# Patient Record
Sex: FEMALE | Race: WHITE | NOT HISPANIC OR LATINO | Employment: FULL TIME | URBAN - METROPOLITAN AREA
[De-identification: names, ages, dates, MRNs, and addresses within clinical notes are randomized per-mention and may not be internally consistent; named-entity substitution may affect disease eponyms.]

---

## 2022-12-30 ENCOUNTER — HOSPITAL ENCOUNTER (EMERGENCY)
Facility: HOSPITAL | Age: 47
Discharge: HOME | End: 2022-12-30
Attending: EMERGENCY MEDICINE

## 2022-12-30 ENCOUNTER — APPOINTMENT (EMERGENCY)
Dept: CT IMAGING | Facility: HOSPITAL | Age: 47
End: 2022-12-30

## 2022-12-30 VITALS
OXYGEN SATURATION: 99 % | TEMPERATURE: 97 F | DIASTOLIC BLOOD PRESSURE: 72 MMHG | RESPIRATION RATE: 18 BRPM | HEART RATE: 93 BPM | SYSTOLIC BLOOD PRESSURE: 113 MMHG

## 2022-12-30 DIAGNOSIS — S91.112A LACERATION OF LEFT GREAT TOE: ICD-10-CM

## 2022-12-30 DIAGNOSIS — S61.511A LACERATION OF RIGHT WRIST, INITIAL ENCOUNTER: ICD-10-CM

## 2022-12-30 DIAGNOSIS — S02.2XXA NASAL FRACTURE: ICD-10-CM

## 2022-12-30 DIAGNOSIS — R55 SYNCOPE: Primary | ICD-10-CM

## 2022-12-30 LAB
ANION GAP SERPL CALCULATED.3IONS-SCNC: 8 MMOL/L (ref 4–13)
BASOPHILS # BLD AUTO: 0.02 THOUSANDS/ÂΜL (ref 0–0.1)
BASOPHILS NFR BLD AUTO: 0 % (ref 0–1)
BUN SERPL-MCNC: 14 MG/DL (ref 5–25)
CALCIUM SERPL-MCNC: 8 MG/DL (ref 8.3–10.1)
CHLORIDE SERPL-SCNC: 105 MMOL/L (ref 96–108)
CO2 SERPL-SCNC: 25 MMOL/L (ref 21–32)
CREAT SERPL-MCNC: 0.92 MG/DL (ref 0.6–1.3)
EOSINOPHIL # BLD AUTO: 0.04 THOUSAND/ÂΜL (ref 0–0.61)
EOSINOPHIL NFR BLD AUTO: 1 % (ref 0–6)
ERYTHROCYTE [DISTWIDTH] IN BLOOD BY AUTOMATED COUNT: 12.5 % (ref 11.6–15.1)
GFR SERPL CREATININE-BSD FRML MDRD: 73 ML/MIN/1.73SQ M
GLUCOSE SERPL-MCNC: 108 MG/DL (ref 65–140)
HCT VFR BLD AUTO: 35.9 % (ref 34.8–46.1)
HGB BLD-MCNC: 11.9 G/DL (ref 11.5–15.4)
IMM GRANULOCYTES # BLD AUTO: 0.05 THOUSAND/UL (ref 0–0.2)
IMM GRANULOCYTES NFR BLD AUTO: 1 % (ref 0–2)
LYMPHOCYTES # BLD AUTO: 0.64 THOUSANDS/ÂΜL (ref 0.6–4.47)
LYMPHOCYTES NFR BLD AUTO: 7 % (ref 14–44)
MCH RBC QN AUTO: 28.5 PG (ref 26.8–34.3)
MCHC RBC AUTO-ENTMCNC: 33.1 G/DL (ref 31.4–37.4)
MCV RBC AUTO: 86 FL (ref 82–98)
MONOCYTES # BLD AUTO: 0.75 THOUSAND/ÂΜL (ref 0.17–1.22)
MONOCYTES NFR BLD AUTO: 9 % (ref 4–12)
NEUTROPHILS # BLD AUTO: 7.29 THOUSANDS/ÂΜL (ref 1.85–7.62)
NEUTS SEG NFR BLD AUTO: 82 % (ref 43–75)
NRBC BLD AUTO-RTO: 0 /100 WBCS
PLATELET # BLD AUTO: 205 THOUSANDS/UL (ref 149–390)
PMV BLD AUTO: 9.4 FL (ref 8.9–12.7)
POTASSIUM SERPL-SCNC: 3.8 MMOL/L (ref 3.5–5.3)
RBC # BLD AUTO: 4.17 MILLION/UL (ref 3.81–5.12)
SODIUM SERPL-SCNC: 138 MMOL/L (ref 135–147)
WBC # BLD AUTO: 8.79 THOUSAND/UL (ref 4.31–10.16)

## 2022-12-30 NOTE — DISCHARGE INSTRUCTIONS
Your nose has a minimally displaced fracture follow-up with otolaryngology  You can wash your lacerations after 3 to 5 days  The wound adhesive will slowly wear off  Return increasing weakness, passing out again or any problems

## 2022-12-30 NOTE — ED PROVIDER NOTES
History  Chief Complaint   Patient presents with   • Syncope     Syncope while in bathroom, fell forward, injured lac, 2 other minor lacs      HPI is a 53-year-old female, reports awoke and felt nauseous this morning apparently sat on the toilet and used a ceramic trash can to throw up into  She reports then passing out  Patient reports everything went black and she fell forward  Patient has 2 small abrasion type lacerations on her left side of her nose  She has some swelling around the nose  She reports some tenderness of the left central incisor but she reports its not loose  She reports a laceration on the radial side of the right wrist   She reports a small laceration on the base of the left great toe she believes from the broken trash can  Denies any recent drinking  She reports they were tubing all day yesterday she was probably dehydrated and with standing got up and got nauseous today when she awoke  Ports history of syncope in the past very similar episode approximately 20 years ago  Past medical history no history of heart disease, denies anemia  Family history noncontributory   Social history, visiting the area, denies drug abuse    None       No past medical history on file  No past surgical history on file  No family history on file  I have reviewed and agree with the history as documented  No existing history information found  No existing history information found  Review of Systems   Constitutional: Negative for diaphoresis, fatigue and fever  HENT: Negative for congestion, ear pain, nosebleeds and sore throat  Eyes: Negative for photophobia, pain, discharge and visual disturbance  Respiratory: Negative for cough, choking, chest tightness, shortness of breath and wheezing  Cardiovascular: Negative for chest pain and palpitations  Gastrointestinal: Negative for abdominal distention, abdominal pain, diarrhea and vomiting     Genitourinary: Negative for dysuria, flank pain and frequency  Musculoskeletal: Negative for back pain, gait problem and joint swelling  Skin: Positive for wound  Negative for color change and rash  Neurological: Negative for dizziness, syncope and headaches  Psychiatric/Behavioral: Negative for behavioral problems and confusion  The patient is not nervous/anxious  All other systems reviewed and are negative  Physical Exam  Physical Exam  Vitals and nursing note reviewed  Constitutional:       Appearance: She is well-developed  HENT:      Head: Normocephalic  Right Ear: External ear normal       Left Ear: External ear normal       Nose: Nose normal       Comments: There are 2 superficial abrasions on the patient's left side of her nose, mild nasal swelling, patient can breathe out of both nostrils no septal hematoma  Eyes:      General: Lids are normal       Pupils: Pupils are equal, round, and reactive to light  Cardiovascular:      Rate and Rhythm: Normal rate and regular rhythm  Pulses: Normal pulses  Heart sounds: Normal heart sounds  Pulmonary:      Effort: Pulmonary effort is normal  No respiratory distress  Abdominal:      General: Abdomen is flat  Bowel sounds are normal       Tenderness: There is no abdominal tenderness  Musculoskeletal:         General: No deformity  Normal range of motion  Cervical back: Normal range of motion and neck supple  Skin:     General: Skin is warm and dry  Comments: Is a small centimeter laceration on the patient's left great toe at the base on the volar surface  There are 2 small lacerations on the patient's right wrist 1 approximately 1 cm 1-1/2 cm  Distal l neurovascular tendon intact in all lacerations  No foreign body seen  Closed with wound adhesive   Neurological:      General: No focal deficit present  Mental Status: She is alert and oriented to person, place, and time     Psychiatric:         Mood and Affect: Mood normal          Vital Signs  ED Triage Vitals [12/30/22 0814]   Temperature Pulse Respirations Blood Pressure SpO2   (!) 97 °F (36 1 °C) 93 18 113/72 99 %      Temp src Heart Rate Source Patient Position - Orthostatic VS BP Location FiO2 (%)   -- -- -- -- --      Pain Score       --           Vitals:    12/30/22 0814   BP: 113/72   Pulse: 93         Visual Acuity      ED Medications  Medications - No data to display    Diagnostic Studies  Results Reviewed     Procedure Component Value Units Date/Time    Basic metabolic panel [[id number]]  (Abnormal) Collected: 12/30/22 0831    Lab Status: Final result Specimen: Blood from Arm, Left Updated: 12/30/22 0855     Sodium 138 mmol/L      Potassium 3 8 mmol/L      Chloride 105 mmol/L      CO2 25 mmol/L      ANION GAP 8 mmol/L      BUN 14 mg/dL      Creatinine 0 92 mg/dL      Glucose 108 mg/dL      Calcium 8 0 mg/dL      eGFR 73 ml/min/1 73sq m     Narrative:      [facility] guidelines for Chronic Kidney Disease (CKD):   •  Stage 1 with normal or high GFR (GFR > 90 mL/min/1 73 square meters)  •  Stage 2 Mild CKD (GFR = 60-89 mL/min/1 73 square meters)  •  Stage 3A Moderate CKD (GFR = 45-59 mL/min/1 73 square meters)  •  Stage 3B Moderate CKD (GFR = 30-44 mL/min/1 73 square meters)  •  Stage 4 Severe CKD (GFR = 15-29 mL/min/1 73 square meters)  •  Stage 5 End Stage CKD (GFR <15 mL/min/1 73 square meters)  Note: GFR calculation is accurate only with a steady state creatinine    CBC and differential [[id number]]  (Abnormal) Collected: 12/30/22 0831    Lab Status: Final result Specimen: Blood from Arm, Left Updated: 12/30/22 0841     WBC 8 79 Thousand/uL      RBC 4 17 Million/uL      Hemoglobin 11 9 g/dL      Hematocrit 35 9 %      MCV 86 fL      MCH 28 5 pg      MCHC 33 1 g/dL      RDW 12 5 %      MPV 9 4 fL      Platelets 133 Thousands/uL      nRBC 0 /100 WBCs      Neutrophils Relative 82 %      Immat GRANS % 1 %      Lymphocytes Relative 7 %      Monocytes Relative 9 % Eosinophils Relative 1 %      Basophils Relative 0 %      Neutrophils Absolute 7 29 Thousands/µL      Immature Grans Absolute 0 05 Thousand/uL      Lymphocytes Absolute 0 64 Thousands/µL      Monocytes Absolute 0 75 Thousand/µL      Eosinophils Absolute 0 04 Thousand/µL      Basophils Absolute 0 02 Thousands/µL                  CT head without contrast   Final Result by Jimmy Dee MD (12/30 0915)      No acute intracranial abnormality  Workstation performed: [id number]         CT facial bones without contrast   Final Result by Jimmy Dee MD (12/30 5983)      Minimally depressed nasal bone fracture, otherwise unremarkable study  Workstation performed: [id number]                    Procedures  ECG 12 Lead Documentation Only    Date/Time: 12/30/2022 8:28 AM  Performed by: Payal Lopez MD  Authorized by: Payal Lopez MD     Indications / Diagnosis:  Syncope  ECG reviewed by me, the ED Provider: yes    Patient location:  ED  Previous ECG:     Previous ECG:  Unavailable  Interpretation:     Interpretation: normal    Quality:     Tracing quality:  Limited by artifact  Rate:     ECG rate:  93    ECG rate assessment: normal    Rhythm:     Rhythm: sinus rhythm    Comments:      Normal sinus rhythm no acute ST-T wave changes    Laceration repair    Date/Time: 12/30/2022 8:40 AM  Performed by: Payal Lopez MD  Authorized by: Payal Lopez MD   Consent: Verbal consent obtained  Patient identity confirmed: verbally with patient  Body area: lower extremity  Location details: left big toe  Laceration length: 2 cm  Tendon involvement: none  Nerve involvement: none    Wound Dehiscence:  Superficial Wound Dehiscence: simple closure      Procedure Details:  Irrigation solution: saline  Comments: Wound was closed with wound adhesive, there is also a small laceration on the patient's right wrist I closed with wound adhesive also  Both wounds were irrigated prior to closure  ED Course                               SBIRT 20yo+    Flowsheet Row Most Recent Value   SBIRT (23 yo +)    In order to provide better care to our patients, we are screening all of our patients for alcohol and drug use  Would it be okay to ask you these screening questions? Yes Filed at: 12/30/2022 0539   Initial Alcohol Screen: US AUDIT-C     1  How often do you have a drink containing alcohol? 0 Filed at: 12/30/2022 0947   2  How many drinks containing alcohol do you have on a typical day you are drinking? 0 Filed at: 12/30/2022 0947   3a  Male UNDER 65: How often do you have five or more drinks on one occasion? 0 Filed at: 12/30/2022 0947   3b  FEMALE Any Age, or MALE 65+: How often do you have 4 or more drinks on one occassion? 0 Filed at: 12/30/2022 0947   Audit-C Score 0 Filed at: 12/30/2022 4422   ANCELMO: How many times in the past year have you    Used an illegal drug or used a prescription medication for non-medical reasons? Never Filed at: 12/30/2022 7910                    [facility] medical decision making 57-year-old female presents emergency department apparently woke up nauseous feeling like she was going to vomit, sat on the toilet and passed out into a ceramic trash can  Reports some lacerations from the trash can on her left great toe and on her right wrist they were closed with wound adhesive by me after irrigation  Also reports hitting her nose that were superficial lacerations on her nose  CT of the brain showed no acute pathology  Sign of any cranial bleeding from the injury  Did have a nasal fracture on CT of the facial bones  Wound care we discussed follow-up  Presentation most consistent with vasovagal syncope  Indication for further hospitalization or dilation at this time      Disposition  Final diagnoses:   Syncope   Nasal fracture   Laceration of right wrist, initial encounter   Laceration of left great toe     Time reflects when diagnosis was documented in both MDM as applicable and the Disposition within this note     Time User Action Codes Description Comment    12/30/2022  9:39 AM [place] Kohut Add [R55] Syncope     12/30/2022  9:39 AM [place] Kohut Add [S02  2XXA] Nasal fracture     12/30/2022  9:39 AM [place] Kohut Add [D04 119D] Laceration of right wrist, initial encounter     12/30/2022  9:39 AM [place] Koh[place] Add [R22 696X] Laceration of left great toe       ED Disposition     ED Disposition   Discharge    Condition   Stable    Date/Time   Fri Dec 30, 2022  9:39 AM    Comment   Miriam Casanova discharge to home/self care  Follow-up Information    None         There are no discharge medications for this patient  No discharge procedures on file      PDMP Review     None          ED Provider  Electronically Signed by           Augusto Guzman MD  12/30/22 9729

## 2022-12-31 LAB
ATRIAL RATE: 93 BPM
P AXIS: 64 DEGREES
PR INTERVAL: 152 MS
QRS AXIS: 67 DEGREES
QRSD INTERVAL: 74 MS
QT INTERVAL: 366 MS
QTC INTERVAL: 455 MS
T WAVE AXIS: 83 DEGREES
VENTRICULAR RATE: 93 BPM